# Patient Record
Sex: FEMALE | Race: WHITE | NOT HISPANIC OR LATINO
[De-identification: names, ages, dates, MRNs, and addresses within clinical notes are randomized per-mention and may not be internally consistent; named-entity substitution may affect disease eponyms.]

---

## 2017-03-28 PROBLEM — Z00.00 ENCOUNTER FOR PREVENTIVE HEALTH EXAMINATION: Status: ACTIVE | Noted: 2017-03-28

## 2017-04-12 ENCOUNTER — APPOINTMENT (OUTPATIENT)
Dept: OPHTHALMOLOGY | Facility: CLINIC | Age: 62
End: 2017-04-12

## 2017-04-12 DIAGNOSIS — H25.013 CORTICAL AGE-RELATED CATARACT, BILATERAL: ICD-10-CM

## 2017-04-12 DIAGNOSIS — H35.3131 NONEXUDATIVE AGE-RELATED MACULAR DEGENERATION, BILATERAL, EARLY DRY STAGE: ICD-10-CM

## 2017-08-08 ENCOUNTER — RESULT REVIEW (OUTPATIENT)
Age: 62
End: 2017-08-08

## 2018-05-29 ENCOUNTER — RESULT REVIEW (OUTPATIENT)
Age: 63
End: 2018-05-29

## 2019-04-05 ENCOUNTER — TRANSCRIPTION ENCOUNTER (OUTPATIENT)
Age: 64
End: 2019-04-05

## 2019-04-06 ENCOUNTER — EMERGENCY (EMERGENCY)
Facility: HOSPITAL | Age: 64
LOS: 1 days | Discharge: HOME | End: 2019-04-06
Attending: EMERGENCY MEDICINE
Payer: COMMERCIAL

## 2019-04-06 VITALS
OXYGEN SATURATION: 99 % | TEMPERATURE: 98 F | SYSTOLIC BLOOD PRESSURE: 125 MMHG | HEART RATE: 74 BPM | WEIGHT: 167.99 LBS | HEIGHT: 63 IN | RESPIRATION RATE: 17 BRPM | DIASTOLIC BLOOD PRESSURE: 82 MMHG

## 2019-04-06 VITALS
RESPIRATION RATE: 18 BRPM | HEART RATE: 80 BPM | OXYGEN SATURATION: 99 % | TEMPERATURE: 99 F | SYSTOLIC BLOOD PRESSURE: 122 MMHG | DIASTOLIC BLOOD PRESSURE: 88 MMHG

## 2019-04-06 PROCEDURE — 73610 X-RAY EXAM OF ANKLE: CPT

## 2019-04-06 PROCEDURE — 73610 X-RAY EXAM OF ANKLE: CPT | Mod: 26,RT

## 2019-04-06 PROCEDURE — 73630 X-RAY EXAM OF FOOT: CPT

## 2019-04-06 PROCEDURE — 73562 X-RAY EXAM OF KNEE 3: CPT | Mod: 26,LT

## 2019-04-06 PROCEDURE — 73630 X-RAY EXAM OF FOOT: CPT | Mod: 26,RT

## 2019-04-06 PROCEDURE — 99284 EMERGENCY DEPT VISIT MOD MDM: CPT

## 2019-04-06 PROCEDURE — 73562 X-RAY EXAM OF KNEE 3: CPT

## 2019-04-06 RX ORDER — IBUPROFEN 200 MG
200 TABLET ORAL ONCE
Qty: 0 | Refills: 0 | Status: COMPLETED | OUTPATIENT
Start: 2019-04-06 | End: 2019-04-06

## 2019-04-06 RX ORDER — ACETAMINOPHEN 500 MG
975 TABLET ORAL ONCE
Qty: 0 | Refills: 0 | Status: COMPLETED | OUTPATIENT
Start: 2019-04-06 | End: 2019-04-06

## 2019-04-06 RX ADMIN — Medication 200 MILLIGRAM(S): at 21:05

## 2019-04-06 RX ADMIN — Medication 975 MILLIGRAM(S): at 21:05

## 2019-04-06 NOTE — ED ADULT TRIAGE NOTE - CHIEF COMPLAINT QUOTE
Patient c/o right foot pain and swelling s/p fall on Thursday. Patient went to Urgent care on Thursday. Urgent care called patient today stating possible fracture.

## 2019-04-06 NOTE — ED PROVIDER NOTE - CARE PROVIDER_API CALL
Ananth James (MD)  Orthopaedic Surgery  [address] [facility] [address]  [place] [address]  Phone: [phone]  Fax: [phone]  Follow Up Time:

## 2019-04-06 NOTE — ED ADULT NURSE NOTE - OBJECTIVE STATEMENT
62 Yo female no pertinent medical history c/o R-foot pain. Patient states "I have a vestibular problem on my L-ear. On Thursday, I felt off balance and took a fall forward. I twisted my R- ankle and hit my left knee. I went to the urgent care and they told me I had a fibula fracture. The pain got really severe, my foot looks swollen and blue and I cant put any weight on my R-foot so I decided to come to the ER" Visible swelling of R-foot, with ecchymosis to lateral portion of r-foot. pulses present b/l. Patient able to move foot and toes freely. denies chest pain, SOB, HA, N/V/D, abdominal pain, fever/chills, urinary symptoms, hematuria, weakness, dizziness, numbness, tinging. Skin warm, dry and pink. Pt placed in position of comfort. Pt educated on call bell system and provided call bell. Bed in lowest position, wheels locked, appropriate side rails raised. Pt denies needs at this time.

## 2019-04-06 NOTE — ED PROVIDER NOTE - NS ED ROS FT
Review of Systems:  	•	CONSTITUTIONAL: no fever  	•	SKIN: no rash  	  	•	MUSCULOSKELETAL:  R ankle & L patellar fx  	•	NEUROLOGIC: no weakness  	•	ALLERGY: no rhinitis  	•	PSYSCHIATRIC: no anxiety

## 2019-04-06 NOTE — ED ADULT NURSE NOTE - CHPI ED NUR SYMPTOMS NEG
no abrasion/no weakness/no numbness/no confusion/no loss of consciousness/no tingling/no deformity/no bleeding/no vomiting/no fever

## 2019-04-06 NOTE — ED PROVIDER NOTE - OBJECTIVE STATEMENT
Pertinent PMH/PSH/FHx/SHx and Review of Systems contained within  63yoF PMH DM, donated 1 kidney to    p/w CC R ankle pain after fall 2d ago. pt felt vertiginous, lost balance & twisted R ankle & fell onto L knee. also c/o pain L patella seen at  center yesterday where pre-ortez read of xray was negative for fx but official read today showed an ankle fx, so sent to ED for further eval. no other complaints. taking 200mg advil with relief  ROS negative for: fever, Chest pain, SOB, Nausea, vomiting, diarrhea, abdominal pain, dysuria    FamilyHx and SocialHx not otherwise contributory Pertinent PMH/PSH/FHx/SHx and Review of Systems contained within  63yoF PMH DM, donated 1 kidney to    p/w CC R ankle pain after fall 2d ago. pt felt vertiginous, lost balance & twisted R ankle & fell onto L knee. also c/o pain L patella seen at  center yesterday where pre-ortez read of xray was negative for fx but official read today showed an ankle fx, so sent to ED for further eval. no other complaints. taking 200mg advil with relief  ROS negative for: fever, Chest pain, SOB, Nausea, vomiting, diarrhea, abdominal pain, dysuria    FamilyHx and SocialHx not otherwise contributory        Attending note. Patient was seen in the fast track room #3. Agree with the above. This is complaining of left anterior knee pain as well as right lateral ankle pain. Patient fell 2 days ago. Patient complained of pain, swelling and bruising over the left lateral ankle and was seen at urgent care center yesterday. X-ray was reported negative at that time. Patient received a phone call from the urgent care center today stating there was a fracture to go to the emergency department. Patient denies any left knee swelling. Patient is able to weight-bear with pain on the lateral right ankle. She denies any prior fractures of ankle or knee. She denies any numbness or paresthesia to

## 2019-04-06 NOTE — ED PROVIDER NOTE - PHYSICAL EXAMINATION
*GEN: NAD; well appearing; A+O x3   *HEAD: NC/AT   *EYES/NOSE: PERRL & EOMI b/l  *THROAT: airway patent, moist mucous membranes  *NECK: Neck supple, no masses  *PULMONARY: CTA b/l, symmetric breath sounds.   *CARDIAC: s1s2, regular rhythm, no Murmur  *ABDOMEN:  ND, NT, soft, no guarding, no rebound, no masses   *BACK: no CVA tenderness, Normal  spine   *EXTREMITIES: symmetric pulses, 2+ dp & radial pulses, capillary refill < 2 seconds, no cyanosis, mild b/l LE pitting edema; RLE: mild ttp lateral malleolus, neurovascularly intact  LLE: no point patellar ttp  *SKIN: no rash or bruising   *NEUROLOGIC: alert, CN 2-12 intact, moves all 4 extremities, full active & passive ROM in all extremities, limited gait due to pain  *PSYCH: insight and judgment nl, memory nl, affect nl, thought nl *GEN: NAD; well appearing; A+O x3   *HEAD: NC/AT   *EYES/NOSE: PERRL & EOMI b/l  *THROAT: airway patent, moist mucous membranes  *NECK: Neck supple, no masses  *PULMONARY: CTA b/l, symmetric breath sounds.   *CARDIAC: s1s2, regular rhythm, no Murmur  *ABDOMEN:  ND, NT, soft, no guarding, no rebound, no masses   *BACK: no CVA tenderness, Normal  spine   *EXTREMITIES: symmetric pulses, 2+ dp & radial pulses, capillary refill < 2 seconds, no cyanosis, mild b/l LE pitting edema; RLE: mild ttp lateral malleolus, neurovascularly intact  LLE: no point patellar ttp  *SKIN: no rash or bruising   *NEUROLOGIC: alert, CN 2-12 intact, moves all 4 extremities, full active & passive ROM in all extremities, limited gait due to pain  *PSYCH: insight and judgment nl, memory nl, affect nl, thought nl      Attending note. Patient is alert and in no acute distress. Examination of the right lower leg reveals swelling and ecchymosis on the lateral side. Proximal leg is nontender. Squeeze test is negative. There is no tenderness over the Achilles, medial malleolus or deltoid ligament. There is tenderness over the lateral malleolus. There is no tenderness over ATFL or CFL. There is no tenderness over the base of the fifth metatarsal. Sensation is intact and normal. Distal pulses are intact. Skin is intact and normal.

## 2019-04-06 NOTE — ED PROVIDER NOTE - CARE PLAN
Principal Discharge DX:	Malleolar fracture, right, closed, initial encounter  Goal:	lateral malleolar  Secondary Diagnosis:	Contusion of left knee, initial encounter

## 2019-04-06 NOTE — ED PROVIDER NOTE - NSFOLLOWUPINSTRUCTIONS_ED_ALL_ED_FT
Tylenol two tablets every 4-6 hours as needed or Motrin every 6-8 hours.  Apply ice to area 20 minutes on area every 2-4 hours for the next 48-72 hours.  Crutches or cane with weight bearing as tolerated.   Follow up with Orthopedist in the next week. - Dr. Ananth James

## 2019-04-09 ENCOUNTER — INBOUND DOCUMENT (OUTPATIENT)
Age: 64
End: 2019-04-09

## 2019-05-17 ENCOUNTER — RESULT REVIEW (OUTPATIENT)
Age: 64
End: 2019-05-17

## 2019-06-27 ENCOUNTER — OUTPATIENT (OUTPATIENT)
Dept: OUTPATIENT SERVICES | Facility: HOSPITAL | Age: 64
LOS: 1 days | End: 2019-06-27
Payer: COMMERCIAL

## 2019-06-27 ENCOUNTER — APPOINTMENT (OUTPATIENT)
Dept: ULTRASOUND IMAGING | Facility: HOSPITAL | Age: 64
End: 2019-06-27

## 2019-06-27 DIAGNOSIS — Z00.8 ENCOUNTER FOR OTHER GENERAL EXAMINATION: ICD-10-CM

## 2019-06-27 PROBLEM — Z78.9 OTHER SPECIFIED HEALTH STATUS: Chronic | Status: ACTIVE | Noted: 2019-04-06

## 2019-06-27 PROCEDURE — 76700 US EXAM ABDOM COMPLETE: CPT

## 2019-06-27 PROCEDURE — 76700 US EXAM ABDOM COMPLETE: CPT | Mod: 26

## 2019-12-06 ENCOUNTER — APPOINTMENT (OUTPATIENT)
Dept: CT IMAGING | Facility: CLINIC | Age: 64
End: 2019-12-06
Payer: COMMERCIAL

## 2019-12-06 ENCOUNTER — OUTPATIENT (OUTPATIENT)
Dept: OUTPATIENT SERVICES | Facility: HOSPITAL | Age: 64
LOS: 1 days | End: 2019-12-06
Payer: COMMERCIAL

## 2019-12-06 DIAGNOSIS — Z00.8 ENCOUNTER FOR OTHER GENERAL EXAMINATION: ICD-10-CM

## 2019-12-06 PROCEDURE — 74176 CT ABD & PELVIS W/O CONTRAST: CPT

## 2019-12-06 PROCEDURE — 74176 CT ABD & PELVIS W/O CONTRAST: CPT | Mod: 26

## 2019-12-26 NOTE — ED PROVIDER NOTE - CLINICAL SUMMARY MEDICAL DECISION MAKING FREE TEXT BOX
English jennifer, pgy3: RLE: mild ttp lateral malleolus, neurovascularly intact. LLE: no point patellar ttp. will eval for fx jennifer, pgy3: RLE: mild ttp lateral malleolus, neurovascularly intact. LLE: no point patellar ttp. will eval for fx    Attending note. Fall with a left anterior knee pain as well as right lateral ankle pain. X-rays to evaluate for fracture. Analgesia.
